# Patient Record
Sex: MALE | Race: BLACK OR AFRICAN AMERICAN | Employment: FULL TIME | URBAN - METROPOLITAN AREA
[De-identification: names, ages, dates, MRNs, and addresses within clinical notes are randomized per-mention and may not be internally consistent; named-entity substitution may affect disease eponyms.]

---

## 2018-02-19 PROBLEM — M17.0 PRIMARY OSTEOARTHRITIS OF BOTH KNEES: Status: ACTIVE | Noted: 2018-02-19

## 2018-04-20 PROBLEM — S83.241D ACUTE MEDIAL MENISCAL TEAR, RIGHT, SUBSEQUENT ENCOUNTER: Status: ACTIVE | Noted: 2018-04-20

## 2018-07-24 PROBLEM — Z98.890 POSTOPERATIVE STATE: Status: ACTIVE | Noted: 2018-07-24

## 2018-07-31 PROBLEM — M25.561 ACUTE PAIN OF RIGHT KNEE: Status: ACTIVE | Noted: 2018-07-31

## 2018-07-31 PROBLEM — Z98.890 POST-OPERATIVE STATE: Status: ACTIVE | Noted: 2018-07-31

## 2018-08-13 PROCEDURE — 81003 URINALYSIS AUTO W/O SCOPE: CPT | Performed by: INTERNAL MEDICINE

## 2018-08-13 PROCEDURE — 82746 ASSAY OF FOLIC ACID SERUM: CPT | Performed by: INTERNAL MEDICINE

## 2018-08-13 PROCEDURE — 82607 VITAMIN B-12: CPT | Performed by: INTERNAL MEDICINE

## 2019-03-27 PROBLEM — M25.571 ACUTE RIGHT ANKLE PAIN: Status: ACTIVE | Noted: 2019-03-27

## 2019-03-27 PROBLEM — M25.561 ACUTE PAIN OF RIGHT KNEE: Status: RESOLVED | Noted: 2018-07-31 | Resolved: 2019-03-27

## 2019-03-27 PROBLEM — S83.241D ACUTE MEDIAL MENISCAL TEAR, RIGHT, SUBSEQUENT ENCOUNTER: Status: RESOLVED | Noted: 2018-04-20 | Resolved: 2019-03-27

## 2019-03-27 PROBLEM — M25.551 RIGHT HIP PAIN: Status: ACTIVE | Noted: 2019-03-27

## 2019-10-08 PROBLEM — Z98.890 POSTOPERATIVE STATE: Status: RESOLVED | Noted: 2018-07-24 | Resolved: 2019-10-08

## 2019-10-08 PROBLEM — M25.551 RIGHT HIP PAIN: Status: RESOLVED | Noted: 2019-03-27 | Resolved: 2019-10-08

## 2019-10-08 PROBLEM — Z98.890 POST-OPERATIVE STATE: Status: RESOLVED | Noted: 2018-07-31 | Resolved: 2019-10-08

## 2019-10-08 PROBLEM — N41.9 PROSTATITIS, UNSPECIFIED PROSTATITIS TYPE: Status: ACTIVE | Noted: 2019-10-08

## 2019-10-08 PROBLEM — R35.0 FREQUENCY OF URINATION: Status: ACTIVE | Noted: 2019-10-08

## 2019-10-08 PROBLEM — M25.571 ACUTE RIGHT ANKLE PAIN: Status: RESOLVED | Noted: 2019-03-27 | Resolved: 2019-10-08

## 2019-10-08 PROBLEM — M54.10 RADICULOPATHY, UNSPECIFIED SPINAL REGION: Status: ACTIVE | Noted: 2019-10-08

## 2020-11-16 PROBLEM — M48.061 LUMBAR SPINAL STENOSIS: Status: ACTIVE | Noted: 2020-11-16

## 2020-11-17 PROBLEM — M51.26 HERNIATED INTERVERTEBRAL DISC OF LUMBAR SPINE: Status: ACTIVE | Noted: 2020-11-17

## 2021-03-03 PROBLEM — R03.0 PRE-HYPERTENSION: Status: ACTIVE | Noted: 2021-03-03

## 2021-03-03 PROBLEM — R35.0 FREQUENCY OF URINATION: Status: RESOLVED | Noted: 2019-10-08 | Resolved: 2021-03-03

## 2021-03-09 PROBLEM — K52.9 COLITIS: Status: ACTIVE | Noted: 2021-03-09

## 2021-03-09 PROBLEM — K76.9 LIVER LESION: Status: ACTIVE | Noted: 2021-03-09

## 2021-03-09 PROBLEM — R74.8 ABNORMAL LIVER ENZYMES: Status: ACTIVE | Noted: 2021-03-09

## 2021-03-09 PROBLEM — R35.0 FREQUENCY OF URINATION: Status: ACTIVE | Noted: 2021-03-09

## 2021-07-21 PROBLEM — R35.0 FREQUENCY OF URINATION: Status: RESOLVED | Noted: 2021-03-09 | Resolved: 2021-07-21

## 2021-11-08 PROBLEM — N64.4 MASTALGIA: Status: ACTIVE | Noted: 2021-11-08

## 2021-11-08 PROBLEM — N64.4 BREAST PAIN, RIGHT: Status: ACTIVE | Noted: 2021-11-08

## 2021-11-18 PROBLEM — Z12.5 PROSTATE CANCER SCREENING: Status: ACTIVE | Noted: 2021-11-18

## 2021-11-18 PROBLEM — N64.4 BREAST PAIN, RIGHT: Status: RESOLVED | Noted: 2021-11-08 | Resolved: 2021-11-18

## 2021-11-18 PROBLEM — Z12.11 COLON CANCER SCREENING: Status: ACTIVE | Noted: 2021-11-18

## 2021-11-18 PROBLEM — Z01.818 PREOPERATIVE EXAMINATION: Status: ACTIVE | Noted: 2021-11-18

## 2022-02-02 PROBLEM — D64.9 ANEMIA, UNSPECIFIED TYPE: Status: ACTIVE | Noted: 2022-02-02

## 2022-02-02 PROBLEM — R73.9 HYPERGLYCEMIA: Status: ACTIVE | Noted: 2022-02-02

## 2022-02-02 PROBLEM — R93.89 ABNORMAL CHEST X-RAY: Status: ACTIVE | Noted: 2022-02-02

## 2022-02-02 PROBLEM — Z96.651 HISTORY OF TOTAL RIGHT KNEE REPLACEMENT: Status: ACTIVE | Noted: 2022-02-02

## 2023-02-24 ENCOUNTER — LAB ENCOUNTER (OUTPATIENT)
Dept: LAB | Age: 58
End: 2023-02-24
Attending: INTERNAL MEDICINE
Payer: COMMERCIAL

## 2023-02-24 DIAGNOSIS — Z20.822 ENCOUNTER FOR PREPROCEDURE SCREENING LABORATORY TESTING FOR COVID-19: ICD-10-CM

## 2023-02-24 DIAGNOSIS — Z01.812 ENCOUNTER FOR PREPROCEDURE SCREENING LABORATORY TESTING FOR COVID-19: ICD-10-CM

## 2023-02-25 LAB — SARS-COV-2 RNA RESP QL NAA+PROBE: NOT DETECTED

## 2023-02-27 ENCOUNTER — HOSPITAL ENCOUNTER (OUTPATIENT)
Facility: HOSPITAL | Age: 58
Setting detail: HOSPITAL OUTPATIENT SURGERY
Discharge: HOME | End: 2023-02-27
Attending: INTERNAL MEDICINE | Admitting: INTERNAL MEDICINE
Payer: COMMERCIAL

## 2023-02-27 VITALS — RESPIRATION RATE: 16 BRPM | DIASTOLIC BLOOD PRESSURE: 83 MMHG | SYSTOLIC BLOOD PRESSURE: 132 MMHG | TEMPERATURE: 98 F

## 2023-02-27 DIAGNOSIS — Z20.822 ENCOUNTER FOR PREPROCEDURE SCREENING LABORATORY TESTING FOR COVID-19: Primary | ICD-10-CM

## 2023-02-27 DIAGNOSIS — Z01.812 ENCOUNTER FOR PREPROCEDURE SCREENING LABORATORY TESTING FOR COVID-19: Primary | ICD-10-CM

## 2023-02-27 PROCEDURE — 4A0B7BZ MEASUREMENT OF GASTROINTESTINAL PRESSURE, VIA NATURAL OR ARTIFICIAL OPENING: ICD-10-PCS | Performed by: INTERNAL MEDICINE

## 2023-02-27 RX ORDER — LIDOCAINE HYDROCHLORIDE 20 MG/ML
JELLY TOPICAL
Status: DISCONTINUED | OUTPATIENT
Start: 2023-02-27 | End: 2023-02-27

## 2023-02-27 NOTE — DISCHARGE INSTRUCTIONS
Home Care Instructions Following Esophageal Manometry    Diet:  Resume your regular diet as tolerated unless otherwise instructed. Medication:  If you have questions about resuming your normal medications, please contact your Primary Care Physician. Activities: You may resume your normal daily activities. What to Expect:  Throat discomfort  Runny/congested nose  Minor nose bleed    Contact Your Doctor If:  Active nose bleeding  Significant pain    **If unable to reach your doctor, please go to the [facility] Emergency Room**    - Your referring physician will receive a full report of your examination.  - If you do not hear from your doctor's office within two weeks of your procedure, please call them for your results. You may be able to see your laboratory results in [facility] between 4 and 7 business days. In some cases, your physician may not have viewed the results before they are released to [address] If you have questions regarding your results contact the physician who ordered the test/exam by phone or via [address] by choosing \"Ask a Medical Question. \"

## 2023-02-28 NOTE — OPERATIVE REPORT
Esophageal Motility Report      Navin Lin Patient Status:  [facility]    1965 MRN [id number]   Location [address] [address] Attending No att. providers found          Date: 2023     History:     Navin López is a 65year old male with dysphagia and heartburn. Recent EGD is unremarkable. Esophageal biopsies are ria. Procedure Report:    High-resolution esophageal manometry was performed by passing a motility catheter with 36 circumferential sensors spaced 1 cm apart transnasally. After a period of accommodation, esophageal manometry was measured in response to 10 swallows of water in the supine position. Findings:      1. Esophagogastric junction (EGJ)    EJG is identified at 49.5 cm from the nares. Baseline EGJ is 26.8 mmHg. Integrated relaxation pressure (IRP) is 23.3 mmHG. 2.  Peristalsis    Peristalsis is absent. Pressurization of the esophagus is not seen. 3.  Upper esophageal sphincter    The upper esophageal sphincter (UES) is identified at 21.9 cm from the nares. Resting pressure is normal mmHg. Relaxation is complete. Summary of findings: The study reveals a peristalsis and incomplete relaxation of the LES. These findings are consistent with type I achalasia.

## 2023-10-18 ENCOUNTER — HOSPITAL ENCOUNTER (OUTPATIENT)
Age: 58
End: 2023-10-18
Attending: UROLOGY | Admitting: UROLOGY